# Patient Record
Sex: FEMALE | Race: WHITE | NOT HISPANIC OR LATINO | Employment: OTHER | URBAN - METROPOLITAN AREA
[De-identification: names, ages, dates, MRNs, and addresses within clinical notes are randomized per-mention and may not be internally consistent; named-entity substitution may affect disease eponyms.]

---

## 2023-05-24 ENCOUNTER — OFFICE VISIT (OUTPATIENT)
Dept: PRIMARY CARE | Facility: CLINIC | Age: 63
End: 2023-05-24
Payer: MEDICARE

## 2023-05-24 VITALS — DIASTOLIC BLOOD PRESSURE: 84 MMHG | HEART RATE: 72 BPM | OXYGEN SATURATION: 97 % | SYSTOLIC BLOOD PRESSURE: 125 MMHG

## 2023-05-24 DIAGNOSIS — Z23 NEED FOR PNEUMOCOCCAL VACCINE: ICD-10-CM

## 2023-05-24 DIAGNOSIS — Z12.31 BREAST CANCER SCREENING BY MAMMOGRAM: ICD-10-CM

## 2023-05-24 DIAGNOSIS — E78.5 DYSLIPIDEMIA: ICD-10-CM

## 2023-05-24 DIAGNOSIS — Z78.0 ASYMPTOMATIC POSTMENOPAUSAL ESTROGEN DEFICIENCY: ICD-10-CM

## 2023-05-24 DIAGNOSIS — R73.9 BORDERLINE HYPERGLYCEMIA: Primary | ICD-10-CM

## 2023-05-24 LAB
ALANINE AMINOTRANSFERASE (SGPT) (U/L) IN SER/PLAS: 15 U/L (ref 7–45)
ALBUMIN (G/DL) IN SER/PLAS: 4.6 G/DL (ref 3.4–5)
ALKALINE PHOSPHATASE (U/L) IN SER/PLAS: 105 U/L (ref 33–136)
ANION GAP IN SER/PLAS: 15 MMOL/L (ref 10–20)
ASPARTATE AMINOTRANSFERASE (SGOT) (U/L) IN SER/PLAS: 18 U/L (ref 9–39)
BASOPHILS (10*3/UL) IN BLOOD BY AUTOMATED COUNT: 0.04 X10E9/L (ref 0–0.1)
BASOPHILS/100 LEUKOCYTES IN BLOOD BY AUTOMATED COUNT: 0.8 % (ref 0–2)
BILIRUBIN TOTAL (MG/DL) IN SER/PLAS: 1.4 MG/DL (ref 0–1.2)
CALCIUM (MG/DL) IN SER/PLAS: 10.2 MG/DL (ref 8.6–10.6)
CARBON DIOXIDE, TOTAL (MMOL/L) IN SER/PLAS: 24 MMOL/L (ref 21–32)
CHLORIDE (MMOL/L) IN SER/PLAS: 104 MMOL/L (ref 98–107)
CHOLESTEROL (MG/DL) IN SER/PLAS: 255 MG/DL (ref 0–199)
CHOLESTEROL IN HDL (MG/DL) IN SER/PLAS: 81.4 MG/DL
CHOLESTEROL/HDL RATIO: 3.1
CREATININE (MG/DL) IN SER/PLAS: 0.8 MG/DL (ref 0.5–1.05)
EOSINOPHILS (10*3/UL) IN BLOOD BY AUTOMATED COUNT: 0.16 X10E9/L (ref 0–0.7)
EOSINOPHILS/100 LEUKOCYTES IN BLOOD BY AUTOMATED COUNT: 3.1 % (ref 0–6)
ERYTHROCYTE DISTRIBUTION WIDTH (RATIO) BY AUTOMATED COUNT: 12.4 % (ref 11.5–14.5)
ERYTHROCYTE MEAN CORPUSCULAR HEMOGLOBIN CONCENTRATION (G/DL) BY AUTOMATED: 32.6 G/DL (ref 32–36)
ERYTHROCYTE MEAN CORPUSCULAR VOLUME (FL) BY AUTOMATED COUNT: 99 FL (ref 80–100)
ERYTHROCYTES (10*6/UL) IN BLOOD BY AUTOMATED COUNT: 4.43 X10E12/L (ref 4–5.2)
ESTIMATED AVERAGE GLUCOSE FOR HBA1C: 100 MG/DL
GFR FEMALE: 82 ML/MIN/1.73M2
GLUCOSE (MG/DL) IN SER/PLAS: 94 MG/DL (ref 74–99)
HEMATOCRIT (%) IN BLOOD BY AUTOMATED COUNT: 43.9 % (ref 36–46)
HEMOGLOBIN (G/DL) IN BLOOD: 14.3 G/DL (ref 12–16)
HEMOGLOBIN A1C/HEMOGLOBIN TOTAL IN BLOOD: 5.1 %
IMMATURE GRANULOCYTES/100 LEUKOCYTES IN BLOOD BY AUTOMATED COUNT: 0.4 % (ref 0–0.9)
LDL: 148 MG/DL (ref 0–99)
LEUKOCYTES (10*3/UL) IN BLOOD BY AUTOMATED COUNT: 5.1 X10E9/L (ref 4.4–11.3)
LYMPHOCYTES (10*3/UL) IN BLOOD BY AUTOMATED COUNT: 1.58 X10E9/L (ref 1.2–4.8)
LYMPHOCYTES/100 LEUKOCYTES IN BLOOD BY AUTOMATED COUNT: 30.8 % (ref 13–44)
MONOCYTES (10*3/UL) IN BLOOD BY AUTOMATED COUNT: 0.38 X10E9/L (ref 0.1–1)
MONOCYTES/100 LEUKOCYTES IN BLOOD BY AUTOMATED COUNT: 7.4 % (ref 2–10)
NEUTROPHILS (10*3/UL) IN BLOOD BY AUTOMATED COUNT: 2.95 X10E9/L (ref 1.2–7.7)
NEUTROPHILS/100 LEUKOCYTES IN BLOOD BY AUTOMATED COUNT: 57.5 % (ref 40–80)
NRBC (PER 100 WBCS) BY AUTOMATED COUNT: 0 /100 WBC (ref 0–0)
PLATELETS (10*3/UL) IN BLOOD AUTOMATED COUNT: 278 X10E9/L (ref 150–450)
POTASSIUM (MMOL/L) IN SER/PLAS: 4.5 MMOL/L (ref 3.5–5.3)
PROTEIN TOTAL: 7.4 G/DL (ref 6.4–8.2)
SODIUM (MMOL/L) IN SER/PLAS: 138 MMOL/L (ref 136–145)
THYROTROPIN (MIU/L) IN SER/PLAS BY DETECTION LIMIT <= 0.05 MIU/L: 1.98 MIU/L (ref 0.44–3.98)
TRIGLYCERIDE (MG/DL) IN SER/PLAS: 130 MG/DL (ref 0–149)
UREA NITROGEN (MG/DL) IN SER/PLAS: 15 MG/DL (ref 6–23)
VLDL: 26 MG/DL (ref 0–40)

## 2023-05-24 PROCEDURE — 90677 PCV20 VACCINE IM: CPT | Performed by: INTERNAL MEDICINE

## 2023-05-24 PROCEDURE — 85025 COMPLETE CBC W/AUTO DIFF WBC: CPT

## 2023-05-24 PROCEDURE — G0009 ADMIN PNEUMOCOCCAL VACCINE: HCPCS | Performed by: INTERNAL MEDICINE

## 2023-05-24 PROCEDURE — G0439 PPPS, SUBSEQ VISIT: HCPCS | Performed by: INTERNAL MEDICINE

## 2023-05-24 PROCEDURE — 83036 HEMOGLOBIN GLYCOSYLATED A1C: CPT

## 2023-05-24 PROCEDURE — 1170F FXNL STATUS ASSESSED: CPT | Performed by: INTERNAL MEDICINE

## 2023-05-24 PROCEDURE — 84443 ASSAY THYROID STIM HORMONE: CPT

## 2023-05-24 PROCEDURE — 99214 OFFICE O/P EST MOD 30 MIN: CPT | Performed by: INTERNAL MEDICINE

## 2023-05-24 PROCEDURE — 80061 LIPID PANEL: CPT

## 2023-05-24 PROCEDURE — 80053 COMPREHEN METABOLIC PANEL: CPT

## 2023-05-24 NOTE — PATIENT INSTRUCTIONS
Mirna,     Call [phone] to schedule mammogram in August and bone density anytime.   Go one floor down to [address] for lab work today and I'll mail copies and call with results!   You're getting Prevnar 20, the newest best once in your lifetime then done forever pneumonia shot.     CL

## 2023-06-01 ASSESSMENT — ENCOUNTER SYMPTOMS
OCCASIONAL FEELINGS OF UNSTEADINESS: 0
DEPRESSION: 0
LOSS OF SENSATION IN FEET: 0

## 2023-06-01 ASSESSMENT — PATIENT HEALTH QUESTIONNAIRE - PHQ9
SUM OF ALL RESPONSES TO PHQ9 QUESTIONS 1 AND 2: 0
1. LITTLE INTEREST OR PLEASURE IN DOING THINGS: NOT AT ALL
2. FEELING DOWN, DEPRESSED OR HOPELESS: NOT AT ALL

## 2023-06-01 ASSESSMENT — ACTIVITIES OF DAILY LIVING (ADL)
TAKING_MEDICATION: INDEPENDENT
DOING_HOUSEWORK: INDEPENDENT
GROCERY_SHOPPING: INDEPENDENT
BATHING: INDEPENDENT
MANAGING_FINANCES: INDEPENDENT
DRESSING: INDEPENDENT

## 2023-06-01 NOTE — PROGRESS NOTES
HPI  63 year old patient with no chronic medical history presenting for CPE  Back pain resolved since then  She has no health issues that she wants to discuss about and feel really well.  Review of systems is negative for chest pain, shortness of breath, syncope, dizziness or any other symptoms.   Last set of labs in June 2022 were normal. CBC: Normal. CMP: Normal. Hemoglobin A1c normal, TSH normal. Recent mammogram negative for malignancy 8/2022  Medications:  None  Physical Exam:  Vital signs: /86, HR 82, RR 16, saturating well on room air  General: NAD  Cardiac: RRR S1/S2 normal  Lungs: CTAB  GI: NT/ND  Extremities: No edema  Neuro: No gross deficits  Psych: Alert and oriented x 3  Assessment and Plan:  63 year old patient with no chronic medical history presenting for CPE  -Will repeat labs this year  -Due for mammogram on 8/2023  -Dexa scan due this year  -Pneumonia vaccine this visit  Elevated BP:  She had elevated BP during her visit today but does not have a history of this. We have asked her to check ambulatory blood pressures and report back to us.  -Her ASCVD score will be determined by her repeat blood pressures. If her blood pressures continue to remain elevated, we would recommend starting blood pressure medication such as amlodipine and lipid lowering medication statin.   Health Maintenance:  Pap 2019 with GYN  Tdap 2022  Dexa scan - Due   Bauxite in 2019--due in 10 years  Mammogram - due in 8/2023  Pneumonia vaccine - today   See alternate note

## 2023-06-14 ENCOUNTER — OFFICE VISIT (OUTPATIENT)
Dept: PRIMARY CARE | Facility: CLINIC | Age: 63
End: 2023-06-14
Payer: MEDICARE

## 2023-06-14 DIAGNOSIS — H66.90 ACUTE OTITIS MEDIA, UNSPECIFIED OTITIS MEDIA TYPE: Primary | ICD-10-CM

## 2023-06-14 PROCEDURE — 99214 OFFICE O/P EST MOD 30 MIN: CPT | Performed by: INTERNAL MEDICINE

## 2023-06-14 RX ORDER — AMOXICILLIN AND CLAVULANATE POTASSIUM 875; 125 MG/1; MG/1
875 TABLET, FILM COATED ORAL 2 TIMES DAILY
Qty: 20 TABLET | Refills: 0 | Status: SHIPPED | OUTPATIENT
Start: 2023-06-14 | End: 2023-06-21 | Stop reason: ALTCHOICE

## 2023-06-14 NOTE — PROGRESS NOTES
Mirna Lutz is a 62 yo F presenting for sick visit.   CPE 5.24.23.     *Cough x1 week with L ear fullness   -congested nose   -COVID test at home was negative   -no sputum         1. DLD - without dx of HTN ASCVD 4.8%   -no statin   -may need in the future     2. Elevated BP without dx of HTN   -normotensive today     3.   #HM   -screen labs 5.23   -dexa and mammo pending   -cscope 1.19   -shingrix x2   -tdap 2022         Gen alert well appearin ghoarse   HEENT +very erythematous TM on the left   Pulm CTAB       A/P   Acute OM   -Augmentin x 10 days   -if no sig improvement by next week call me

## 2023-06-21 ENCOUNTER — OFFICE VISIT (OUTPATIENT)
Dept: PRIMARY CARE | Facility: CLINIC | Age: 63
End: 2023-06-21
Payer: MEDICARE

## 2023-06-21 DIAGNOSIS — H66.90 ACUTE OTITIS MEDIA, UNSPECIFIED OTITIS MEDIA TYPE: Primary | ICD-10-CM

## 2023-06-21 PROCEDURE — 99213 OFFICE O/P EST LOW 20 MIN: CPT | Performed by: INTERNAL MEDICINE

## 2023-06-21 RX ORDER — AMOXICILLIN AND CLAVULANATE POTASSIUM 875; 125 MG/1; MG/1
875 TABLET, FILM COATED ORAL 2 TIMES DAILY
Qty: 20 TABLET | Refills: 0 | Status: SHIPPED | OUTPATIENT
Start: 2023-06-21 | End: 2023-07-01

## 2023-06-21 NOTE — PATIENT INSTRUCTIONS
Carmel -   Continue the Augmentin for a total of 2 weeks - I'm sending in extra in case we need it.   Call the referral line and ask to schedule ENT. Let them know I said it was ok to schedule with either otology or general and I'm trying to escalate this referral!     CL

## 2023-06-21 NOTE — PROGRESS NOTES
Mirna Lutz presents in 1 week FU to OM visit 6.14.23     At that time 1 wk URI sxs with prominant erythema of L TM.   -Augmentin x 7 days   -some improvement but persistent L ear pain, fullness with some radiation into inferior neck   -no difficulty swallowing, some pain with swallowing   [ ]continue Augmentin x 2 week total course   [ ]ENT referral        Chronic PL:  1. DLD - without dx of HTN ASCVD 4.8%   -no statin   -may need in the future      2. Elevated BP without dx of HTN   -normotensive today      3.   #HM   -screen labs 5.23   -dexa and mammo pending   -cscope 1.19   -shingrix x2   -tdap 2022            Gen alert well appearin ghoarse   HEENT +very erythematous TM on the left with no sig change since last week no LAD  Pulm CTAB        A/P   Acute OM unresolving x 7 days abx   -continue augmentin   -ENT referral

## 2023-10-05 ENCOUNTER — TELEPHONE (OUTPATIENT)
Dept: OBSTETRICS AND GYNECOLOGY | Facility: CLINIC | Age: 63
End: 2023-10-05
Payer: MEDICARE

## 2023-10-25 ENCOUNTER — ANCILLARY PROCEDURE (OUTPATIENT)
Dept: RADIOLOGY | Facility: CLINIC | Age: 63
End: 2023-10-25
Payer: MEDICARE

## 2023-10-25 DIAGNOSIS — Z12.31 BREAST CANCER SCREENING BY MAMMOGRAM: ICD-10-CM

## 2023-10-25 PROCEDURE — 77067 SCR MAMMO BI INCL CAD: CPT | Mod: 50

## 2023-10-25 PROCEDURE — 77067 SCR MAMMO BI INCL CAD: CPT | Mod: BILATERAL PROCEDURE | Performed by: RADIOLOGY

## 2023-10-25 PROCEDURE — 77063 BREAST TOMOSYNTHESIS BI: CPT | Mod: BILATERAL PROCEDURE | Performed by: RADIOLOGY

## 2023-11-06 ENCOUNTER — APPOINTMENT (OUTPATIENT)
Dept: OBSTETRICS AND GYNECOLOGY | Facility: CLINIC | Age: 63
End: 2023-11-06

## 2023-11-28 ENCOUNTER — OFFICE VISIT (OUTPATIENT)
Dept: PRIMARY CARE | Facility: CLINIC | Age: 63
End: 2023-11-28
Payer: MEDICARE

## 2023-11-28 DIAGNOSIS — J06.9 UPPER RESPIRATORY TRACT INFECTION, UNSPECIFIED TYPE: Primary | ICD-10-CM

## 2023-11-28 PROCEDURE — 99213 OFFICE O/P EST LOW 20 MIN: CPT | Performed by: INTERNAL MEDICINE

## 2023-11-28 PROCEDURE — 1126F AMNT PAIN NOTED NONE PRSNT: CPT | Performed by: INTERNAL MEDICINE

## 2023-11-28 RX ORDER — BUDESONIDE AND FORMOTEROL FUMARATE DIHYDRATE 160; 4.5 UG/1; UG/1
2 AEROSOL RESPIRATORY (INHALATION)
Qty: 1 EACH | Refills: 11 | Status: SHIPPED | OUTPATIENT
Start: 2023-11-28 | End: 2023-12-22

## 2023-11-28 NOTE — PROGRESS NOTES
Mirna Lutz is a 64 yo F presenting for acute visit now x 9 days of URI with prominent dry cough; was exposed to RSV by a granddaughter in interval; did a home COVID test which was negative.   Had URI last in 6.23 s/p Augmentin x 2 wks total course.       CPE/MCR 5.2024.         Mirna Lutz presents in 1 week FU to OM visit 6.14.23      At that time 1 wk URI sxs with prominant erythema of L TM.   -Augmentin x 7 days   -some improvement but persistent L ear pain, fullness with some radiation into inferior neck   -no difficulty swallowing, some pain with swallowing   [ ]continue Augmentin x 2 week total course   [ ]ENT referral         Chronic PL:  1. DLD - without dx of HTN ASCVD 4.8%   -no statin   -may need in the future      2. Elevated BP without dx of HTN   -normotensive today        #HM   -screen labs 5.23   -dexa and mammo pending   -cscope 1.19   -shingrix x2   -tdap 2022               ROS   Gen neg fever neg chills pos mild malaise   Pulm pos cough neg wheeze neg dyspnea       O   Gen well appearing   HENT mmm pharynx clear   CV rrr nl s1/2   Pulm CTAB no w/r/r       A/P   Mirna presents with 64 yo F presenting with 9 days of URI illness in the setting of RSV exposure. Suspected RSV.   We do not have RSV swabs but she really would prefer confirmation. Advised Urgent Care.   Start a LABA-ICS inhaler for post-viral cough.   Consider abx if fails to resolve within total 2 weeks

## 2023-11-28 NOTE — PATIENT INSTRUCTIONS
Mirna,     If not better by next Monday call and we will start an antibiotic.     Do take the inhaler as prescribed daily until cough is fully gone - the inhaler is a months worth. If costly at [facility], call me.     CL

## 2023-12-01 ENCOUNTER — TELEPHONE (OUTPATIENT)
Dept: PRIMARY CARE | Facility: CLINIC | Age: 63
End: 2023-12-01
Payer: MEDICARE

## 2023-12-01 DIAGNOSIS — J06.9 UPPER RESPIRATORY TRACT INFECTION, UNSPECIFIED TYPE: Primary | ICD-10-CM

## 2023-12-01 RX ORDER — METHYLPREDNISOLONE 4 MG/1
TABLET ORAL
Qty: 21 TABLET | Refills: 0 | Status: SHIPPED | OUTPATIENT
Start: 2023-12-01 | End: 2023-12-08

## 2023-12-21 DIAGNOSIS — J06.9 UPPER RESPIRATORY TRACT INFECTION, UNSPECIFIED TYPE: ICD-10-CM

## 2023-12-22 RX ORDER — BUDESONIDE AND FORMOTEROL FUMARATE DIHYDRATE 160; 4.5 UG/1; UG/1
AEROSOL RESPIRATORY (INHALATION)
Qty: 30.6 EACH | Refills: 4 | Status: SHIPPED | OUTPATIENT
Start: 2023-12-22

## 2024-04-29 ENCOUNTER — TELEPHONE (OUTPATIENT)
Dept: PRIMARY CARE | Facility: CLINIC | Age: 64
End: 2024-04-29
Payer: MEDICARE

## 2024-04-30 DIAGNOSIS — E55.9 VITAMIN D DEFICIENCY: ICD-10-CM

## 2024-04-30 DIAGNOSIS — Z00.00 ROUTINE GENERAL MEDICAL EXAMINATION AT A HEALTH CARE FACILITY: Primary | ICD-10-CM

## 2024-04-30 DIAGNOSIS — R73.9 BORDERLINE HYPERGLYCEMIA: ICD-10-CM

## 2024-04-30 DIAGNOSIS — E78.5 DYSLIPIDEMIA: ICD-10-CM

## 2024-05-03 ENCOUNTER — LAB (OUTPATIENT)
Dept: LAB | Facility: LAB | Age: 64
End: 2024-05-03
Payer: MEDICARE

## 2024-05-03 DIAGNOSIS — R73.9 BORDERLINE HYPERGLYCEMIA: ICD-10-CM

## 2024-05-03 DIAGNOSIS — E78.5 DYSLIPIDEMIA: ICD-10-CM

## 2024-05-03 DIAGNOSIS — Z00.00 ROUTINE GENERAL MEDICAL EXAMINATION AT A HEALTH CARE FACILITY: ICD-10-CM

## 2024-05-03 DIAGNOSIS — E55.9 VITAMIN D DEFICIENCY: ICD-10-CM

## 2024-05-03 LAB
25(OH)D3 SERPL-MCNC: 37 NG/ML (ref 30–100)
ALBUMIN SERPL BCP-MCNC: 4.3 G/DL (ref 3.4–5)
ALP SERPL-CCNC: 93 U/L (ref 33–136)
ALT SERPL W P-5'-P-CCNC: 13 U/L (ref 7–45)
ANION GAP SERPL CALC-SCNC: 14 MMOL/L (ref 10–20)
AST SERPL W P-5'-P-CCNC: 16 U/L (ref 9–39)
BASOPHILS # BLD AUTO: 0.05 X10*3/UL (ref 0–0.1)
BASOPHILS NFR BLD AUTO: 1 %
BILIRUB SERPL-MCNC: 1.1 MG/DL (ref 0–1.2)
BUN SERPL-MCNC: 15 MG/DL (ref 6–23)
CALCIUM SERPL-MCNC: 9.8 MG/DL (ref 8.6–10.6)
CHLORIDE SERPL-SCNC: 103 MMOL/L (ref 98–107)
CHOLEST SERPL-MCNC: 244 MG/DL (ref 0–199)
CHOLESTEROL/HDL RATIO: 3.4
CO2 SERPL-SCNC: 26 MMOL/L (ref 21–32)
CREAT SERPL-MCNC: 0.72 MG/DL (ref 0.5–1.05)
EGFRCR SERPLBLD CKD-EPI 2021: >90 ML/MIN/1.73M*2
EOSINOPHIL # BLD AUTO: 0.12 X10*3/UL (ref 0–0.7)
EOSINOPHIL NFR BLD AUTO: 2.3 %
ERYTHROCYTE [DISTWIDTH] IN BLOOD BY AUTOMATED COUNT: 12.5 % (ref 11.5–14.5)
EST. AVERAGE GLUCOSE BLD GHB EST-MCNC: 91 MG/DL
GLUCOSE SERPL-MCNC: 97 MG/DL (ref 74–99)
HBA1C MFR BLD: 4.8 %
HCT VFR BLD AUTO: 41.7 % (ref 36–46)
HDLC SERPL-MCNC: 70.9 MG/DL
HGB BLD-MCNC: 13.7 G/DL (ref 12–16)
IMM GRANULOCYTES # BLD AUTO: 0.02 X10*3/UL (ref 0–0.7)
IMM GRANULOCYTES NFR BLD AUTO: 0.4 % (ref 0–0.9)
LDLC SERPL CALC-MCNC: 154 MG/DL
LYMPHOCYTES # BLD AUTO: 1.57 X10*3/UL (ref 1.2–4.8)
LYMPHOCYTES NFR BLD AUTO: 30.4 %
MCH RBC QN AUTO: 32.1 PG (ref 26–34)
MCHC RBC AUTO-ENTMCNC: 32.9 G/DL (ref 32–36)
MCV RBC AUTO: 98 FL (ref 80–100)
MONOCYTES # BLD AUTO: 0.39 X10*3/UL (ref 0.1–1)
MONOCYTES NFR BLD AUTO: 7.5 %
NEUTROPHILS # BLD AUTO: 3.02 X10*3/UL (ref 1.2–7.7)
NEUTROPHILS NFR BLD AUTO: 58.4 %
NON HDL CHOLESTEROL: 173 MG/DL (ref 0–149)
NRBC BLD-RTO: 0 /100 WBCS (ref 0–0)
PLATELET # BLD AUTO: 256 X10*3/UL (ref 150–450)
POTASSIUM SERPL-SCNC: 4.3 MMOL/L (ref 3.5–5.3)
PROT SERPL-MCNC: 6.9 G/DL (ref 6.4–8.2)
RBC # BLD AUTO: 4.27 X10*6/UL (ref 4–5.2)
SODIUM SERPL-SCNC: 139 MMOL/L (ref 136–145)
TRIGL SERPL-MCNC: 96 MG/DL (ref 0–149)
TSH SERPL-ACNC: 2.52 MIU/L (ref 0.44–3.98)
VLDL: 19 MG/DL (ref 0–40)
WBC # BLD AUTO: 5.2 X10*3/UL (ref 4.4–11.3)

## 2024-05-03 PROCEDURE — 80053 COMPREHEN METABOLIC PANEL: CPT

## 2024-05-03 PROCEDURE — 84443 ASSAY THYROID STIM HORMONE: CPT

## 2024-05-03 PROCEDURE — 83036 HEMOGLOBIN GLYCOSYLATED A1C: CPT

## 2024-05-03 PROCEDURE — 80061 LIPID PANEL: CPT

## 2024-05-03 PROCEDURE — 85025 COMPLETE CBC W/AUTO DIFF WBC: CPT

## 2024-05-03 PROCEDURE — 36415 COLL VENOUS BLD VENIPUNCTURE: CPT

## 2024-05-03 PROCEDURE — 82306 VITAMIN D 25 HYDROXY: CPT

## 2024-05-17 ENCOUNTER — OFFICE VISIT (OUTPATIENT)
Dept: PRIMARY CARE | Facility: CLINIC | Age: 64
End: 2024-05-17
Payer: MEDICARE

## 2024-05-17 VITALS
BODY MASS INDEX: 23.57 KG/M2 | HEART RATE: 76 BPM | SYSTOLIC BLOOD PRESSURE: 110 MMHG | OXYGEN SATURATION: 97 % | DIASTOLIC BLOOD PRESSURE: 80 MMHG | WEIGHT: 155 LBS

## 2024-05-17 DIAGNOSIS — E78.5 DYSLIPIDEMIA: Primary | ICD-10-CM

## 2024-05-17 DIAGNOSIS — Z12.31 BREAST CANCER SCREENING BY MAMMOGRAM: ICD-10-CM

## 2024-05-17 DIAGNOSIS — Z78.0 ASYMPTOMATIC POSTMENOPAUSAL STATE: ICD-10-CM

## 2024-05-17 PROCEDURE — G0439 PPPS, SUBSEQ VISIT: HCPCS | Performed by: INTERNAL MEDICINE

## 2024-05-17 PROCEDURE — 1170F FXNL STATUS ASSESSED: CPT | Performed by: INTERNAL MEDICINE

## 2024-05-17 RX ORDER — ROSUVASTATIN CALCIUM 5 MG/1
5 TABLET, COATED ORAL DAILY
Qty: 100 TABLET | Refills: 3 | Status: SHIPPED | OUTPATIENT
Start: 2024-05-17 | End: 2025-06-21

## 2024-05-17 ASSESSMENT — ACTIVITIES OF DAILY LIVING (ADL)
MANAGING_FINANCES: INDEPENDENT
GROCERY_SHOPPING: INDEPENDENT
BATHING: INDEPENDENT
TAKING_MEDICATION: INDEPENDENT
DRESSING: INDEPENDENT
DOING_HOUSEWORK: INDEPENDENT

## 2024-05-17 ASSESSMENT — PATIENT HEALTH QUESTIONNAIRE - PHQ9
SUM OF ALL RESPONSES TO PHQ9 QUESTIONS 1 AND 2: 0
2. FEELING DOWN, DEPRESSED OR HOPELESS: NOT AT ALL
1. LITTLE INTEREST OR PLEASURE IN DOING THINGS: NOT AT ALL

## 2024-05-17 ASSESSMENT — ENCOUNTER SYMPTOMS
DEPRESSION: 0
LOSS OF SENSATION IN FEET: 0
OCCASIONAL FEELINGS OF UNSTEADINESS: 0

## 2024-05-17 NOTE — PATIENT INSTRUCTIONS
Mirna,     Start rosuvastatin 5 mg daily - anytime of the day, with or without food, OK if you miss it occasionally. I am ordering repeat labs for BEFORE your next visit with Dr. Darling - so you can do them, fasting from midnight the night before. He will review these.   Call [phone] to schedule bone density anytime and mammogram 10- or later.     CL

## 2024-05-17 NOTE — PROGRESS NOTES
Mirna Lutz is a 63 yo F presenting for CPE/MCR.     DLD, ASCVD 4.8 last year - discussed at length. New start rosuva 5. Repeat labs 6 mo.     Elevated BP without dx of HTN     Complicated URI/OM last fall 2023       #HM   -screen labs 5.2024   -mammo 10.25.2023   -dexa due & ordered   -cscope 1.2019 - 10 yrs       Gen Aox3 NAD well appearing   HEENT mmm pharynx clear no cervical LAD   Eyes sclerae clear   CV rrr nl s1/2 no m/r/g  Pulm CTAB no adventitious sounds   Ext warm dry no edema 2+ DP pulse

## 2024-09-05 ENCOUNTER — OFFICE VISIT (OUTPATIENT)
Dept: PRIMARY CARE | Facility: CLINIC | Age: 64
End: 2024-09-05
Payer: MEDICARE

## 2024-09-05 VITALS — DIASTOLIC BLOOD PRESSURE: 74 MMHG | SYSTOLIC BLOOD PRESSURE: 123 MMHG

## 2024-09-05 DIAGNOSIS — E78.5 DYSLIPIDEMIA: Primary | ICD-10-CM

## 2024-09-05 DIAGNOSIS — M76.61 ACHILLES TENDINITIS OF RIGHT LOWER EXTREMITY: ICD-10-CM

## 2024-09-05 PROCEDURE — 99213 OFFICE O/P EST LOW 20 MIN: CPT | Performed by: INTERNAL MEDICINE

## 2024-09-05 NOTE — PROGRESS NOTES
Subjective   Patient ID: Mirna Lutz is a 64 y.o. female who presents for No chief complaint on file..    HPI Sick visit was in [place] for a wedding was getting a rash is slipped and pulls her left leg bruising occurred later she usually walks or does yoga for exercise and found that she could not do that no chest pain no shortness of breath no fever does not take any blood thinners has a longstanding dermatitis on her legs had been on an airplane to come home    Review of Systems    Objective   There were no vitals taken for this visit.    Physical Exam  Vital signs noted alert and oriented x 3 NCAT no JVD chest clear to auscultation CV regular rate and rhythm S1-S2 without murmur gallop or rub extremities no clubbing cyanosis or edema on the left on the right there is a large portion of the tibial area anteriorly with ecchymoses running down into the medial malleolus good range of motion of the ankle some tightness over the Achilles and the posterior gastroc  Assessment/Plan    impression gastroc strain Achilles strain tibialis tendinitis other diagnoses  Plan PT now that is been more than 1 week since original injury gentle range of motion physical therapy if no better good shoe wear Tylenol if needed discussed with her recent blood work and cholesterol and cholesterol medicine she is not taking discussed with risk-benefit side effects and may consider to do so and then follow-up good hydration and recheck

## 2024-10-31 ENCOUNTER — LAB (OUTPATIENT)
Dept: LAB | Facility: LAB | Age: 64
End: 2024-10-31
Payer: MEDICARE

## 2024-10-31 DIAGNOSIS — E78.5 DYSLIPIDEMIA: ICD-10-CM

## 2024-10-31 LAB
ALBUMIN SERPL BCP-MCNC: 4.6 G/DL (ref 3.4–5)
ALP SERPL-CCNC: 85 U/L (ref 33–136)
ALT SERPL W P-5'-P-CCNC: 15 U/L (ref 7–45)
ANION GAP SERPL CALC-SCNC: 14 MMOL/L (ref 10–20)
AST SERPL W P-5'-P-CCNC: 16 U/L (ref 9–39)
BILIRUB SERPL-MCNC: 1.2 MG/DL (ref 0–1.2)
BUN SERPL-MCNC: 18 MG/DL (ref 6–23)
CALCIUM SERPL-MCNC: 9.5 MG/DL (ref 8.6–10.6)
CHLORIDE SERPL-SCNC: 104 MMOL/L (ref 98–107)
CHOLEST SERPL-MCNC: 258 MG/DL (ref 0–199)
CHOLESTEROL/HDL RATIO: 3.5
CO2 SERPL-SCNC: 25 MMOL/L (ref 21–32)
CREAT SERPL-MCNC: 0.76 MG/DL (ref 0.5–1.05)
EGFRCR SERPLBLD CKD-EPI 2021: 87 ML/MIN/1.73M*2
GLUCOSE SERPL-MCNC: 96 MG/DL (ref 74–99)
HDLC SERPL-MCNC: 73.4 MG/DL
LDLC SERPL CALC-MCNC: 165 MG/DL
NON HDL CHOLESTEROL: 185 MG/DL (ref 0–149)
POTASSIUM SERPL-SCNC: 4.3 MMOL/L (ref 3.5–5.3)
PROT SERPL-MCNC: 7 G/DL (ref 6.4–8.2)
SODIUM SERPL-SCNC: 139 MMOL/L (ref 136–145)
TRIGL SERPL-MCNC: 98 MG/DL (ref 0–149)
VLDL: 20 MG/DL (ref 0–40)

## 2024-10-31 PROCEDURE — 80053 COMPREHEN METABOLIC PANEL: CPT

## 2024-10-31 PROCEDURE — 80061 LIPID PANEL: CPT

## 2024-10-31 PROCEDURE — 36415 COLL VENOUS BLD VENIPUNCTURE: CPT

## 2024-11-06 ENCOUNTER — APPOINTMENT (OUTPATIENT)
Dept: PRIMARY CARE | Facility: CLINIC | Age: 64
End: 2024-11-06
Payer: MEDICARE

## 2024-11-06 VITALS
HEART RATE: 72 BPM | HEIGHT: 68 IN | DIASTOLIC BLOOD PRESSURE: 85 MMHG | BODY MASS INDEX: 23.76 KG/M2 | SYSTOLIC BLOOD PRESSURE: 128 MMHG | OXYGEN SATURATION: 96 % | WEIGHT: 156.8 LBS

## 2024-11-06 DIAGNOSIS — E78.5 DYSLIPIDEMIA: Primary | ICD-10-CM

## 2024-11-06 DIAGNOSIS — R73.9 BORDERLINE HYPERGLYCEMIA: ICD-10-CM

## 2024-11-06 DIAGNOSIS — Z11.59 ENCOUNTER FOR HEPATITIS C SCREENING TEST FOR LOW RISK PATIENT: ICD-10-CM

## 2024-11-06 DIAGNOSIS — Z78.0 ASYMPTOMATIC POSTMENOPAUSAL STATE: ICD-10-CM

## 2024-11-06 DIAGNOSIS — E55.9 MILD VITAMIN D DEFICIENCY: ICD-10-CM

## 2024-11-06 DIAGNOSIS — R68.89 OTHER GENERAL SYMPTOMS AND SIGNS: ICD-10-CM

## 2024-11-06 PROCEDURE — 3008F BODY MASS INDEX DOCD: CPT | Performed by: STUDENT IN AN ORGANIZED HEALTH CARE EDUCATION/TRAINING PROGRAM

## 2024-11-06 PROCEDURE — 1126F AMNT PAIN NOTED NONE PRSNT: CPT | Performed by: STUDENT IN AN ORGANIZED HEALTH CARE EDUCATION/TRAINING PROGRAM

## 2024-11-06 PROCEDURE — 1159F MED LIST DOCD IN RCRD: CPT | Performed by: STUDENT IN AN ORGANIZED HEALTH CARE EDUCATION/TRAINING PROGRAM

## 2024-11-06 PROCEDURE — 1036F TOBACCO NON-USER: CPT | Performed by: STUDENT IN AN ORGANIZED HEALTH CARE EDUCATION/TRAINING PROGRAM

## 2024-11-06 PROCEDURE — 99214 OFFICE O/P EST MOD 30 MIN: CPT | Performed by: STUDENT IN AN ORGANIZED HEALTH CARE EDUCATION/TRAINING PROGRAM

## 2024-11-06 RX ORDER — ROSUVASTATIN CALCIUM 5 MG/1
5 TABLET, COATED ORAL
Qty: 36 TABLET | Refills: 3 | Status: SHIPPED | OUTPATIENT
Start: 2024-11-06 | End: 2025-11-01

## 2024-11-06 ASSESSMENT — PATIENT HEALTH QUESTIONNAIRE - PHQ9
2. FEELING DOWN, DEPRESSED OR HOPELESS: NOT AT ALL
SUM OF ALL RESPONSES TO PHQ9 QUESTIONS 1 AND 2: 0
1. LITTLE INTEREST OR PLEASURE IN DOING THINGS: NOT AT ALL

## 2024-11-06 ASSESSMENT — PAIN SCALES - GENERAL: PAINLEVEL_OUTOF10: 0-NO PAIN

## 2024-11-06 NOTE — PATIENT INSTRUCTIONS
Thank you for coming in today!    Call to schedule CT cardiac scoring (calcium score)  [phone]    Mammogram and DEXA (Bone Density Scan) in December as scheduled    Follow up with pharmacy for Shingles shot and updated COVID booster     Start Rosuvastatin 5mg M/W/F at the new year    Repeat labs about 2 months later  FASTING  Can be done at any  lab, no appointment needed  Plan around early March    Medicare Wellness Visit in May  Reach back out with any questions or concerns!    Dr. BETTENCOURT

## 2024-11-06 NOTE — PROGRESS NOTES
Mirna Lutz is a 64 y.o. female seen in Clinic at [facility] [place] by Dr. Jose Ramon Darling on 11/06/24 for routine care, as well as for management of the following chronic medical conditions: DLD (not currently on statin), borderline elevated BP, family history of breast cancer and cardiac disease. Patient presents today to establish care and transfer from prior primary care provider, Dr. Chavez     #DLD  #Family History of Cardiac Disease (sister/brother/father)   - increased from prior  - not taking Rosuva   - start 5mg every other day or M/W/F  [  ] repeat lipids and labs after 2 months on medication   [  ] CAC scoring to further risk stratify given family history      #Elevated BP without dx of HTN      #Family History of Breast Cancer  - mother  - live to be 93   [  ] annual mammogram, pending 12/2024     Past Medical History:   Past Medical History:   Diagnosis Date    Other specified health status 03/04/2015    No known problems     Subspecialty Medical Care:     Past Surgical History:   Past Surgical History:   Procedure Laterality Date    KNEE SURGERY  03/04/2015    Knee Surgery     Medications:  Current Outpatient Medications:     rosuvastatin (Crestor) 5 mg tablet, Take 1 tablet (5 mg) by mouth once daily. (Patient not taking: Reported on 11/6/2024), Disp: 100 tablet, Rfl: 3  Pharmacy: [facility] ([place])     Allergies: NKDA   No Known Allergies    Immunizations:   - Shingrix advised  - Updated COVID booster advised     Family History:   - CAD in father, brother, sister   Family History   Problem Relation Name Age of Onset    Breast cancer Mother       Social History:   Home/Living Situation/Falls/Safety Assessment: lives at home alone (); kids are local, watches grandchildren   Education/Employment/Work/Vocational: retired mostly,  (special education); worked in pre-K at    Activities: regular physical activity   Drug Use: remote smoker, social alcohol use   Diet: healthy dietary  "habits   Depression/Anxiety: no mental health concerns   Sexuality/Contraception/Menstrual History:    Sleep: none     Patient Information:  Health Insurance: Medicare   Transportation: drives   Healthcare POA/Guardian: Child   Contact Information: correct in EMR     Visit Vitals  /85 (BP Location: Left arm, Patient Position: Sitting, BP Cuff Size: Adult)   Pulse 72   Ht 1.727 m (5' 8\")   Wt 71.1 kg (156 lb 12.8 oz)   SpO2 96%   BMI 23.84 kg/m²   Smoking Status Never   BSA 1.85 m²      PHYSICAL EXAM:   General: well appearing  female, NAD, pleasant and engaged in encounter    HEENT: NCAT, MMM  CV: RRR, no m/r/g  PULM: CTAB, non-labored respirations   ABD: soft, NT, ND, + bowel sounds   : no suprapubic or CVA tenderness   EXT: WWP, no significant edema   SKIN: no rashes noted   NEURO: A&Ox4, symmetric facies, no gross motor or sensory deficits, normal gait  PSYCH: pleasant mood, appropriate affect     Assessment/Plan    Mirna Lutz is a 64 y.o. female seen in Clinic at [facility] [place] [facility] by Dr. Jose Ramon Darling on 11/06/24 for routine care, as well as for management of the following chronic medical conditions: DLD (not currently on statin), borderline elevated BP, family history of breast cancer and cardiac disease. Patient presents today to establish care and transfer from prior primary care provider, Dr. Chavez     #DLD  #Family History of Cardiac Disease (sister/brother/father)   - increased from prior  - not taking Rosuva   - start 5mg every other day or M/W/F  [  ] repeat lipids and labs after 2 months on medication   [  ] CAC scoring to further risk stratify given family history      #Elevated BP without dx of HTN      #Family History of Breast Cancer  - mother  - live to be 93   [  ] annual mammogram, pending 12/2024     #Health Maintenance    Cancer Screening  - Cervical Cancer Screening: last pap smear/HPV testing: last 2019, negative HPV co-testing (follows with GYN)   - Mammography: " pending 12/2024  - Colorectal Cancer Screening: last 01/2019, due 2029   - Lung Cancer Screening: NA     Laboratory Screening  - Lipid Screen: DLD as above   - ASCVD Score: ~6.8%, pending CAC scoring   - A1C, glucose screen: prior negative   - STI, HIV, Hep B screen: defer  - Hep C screen: with next labs     Imaging Screening  - Osteoporosis/DEXA screening: pending 12/2024     Immunizations:   - Influenza: annual UTD 2024  - COVID: updated booster advised  - Tdap: last 2022  - Prevnar, Pneumovax: completed   - Shingrix: advised completing series    Other Screening  - Health Literacy Assessment: excellent   - Depression screen: negative   - Home safety/partner violence screen: negative   - Hearing/Vision screens:   - Alcohol/tobacco/drug use screen: remote smoker, social alcohol   - Healthcare POA/Advanced Directives: children     Referrals: labs, CAC scoring, new start statin   Return to clinic in May 2025 for follow-up and CPE/MCW visit.     Patient Discussion:    Please call back the office with any questions at [phone]. In the case of an emergency, please call 911 or go to the nearest Emergency Department.      Jose Ramon Darling MD  Internal Medicine-Pediatrics  [facility] [address], [address]  P: [phone], F: [phone]

## 2024-11-11 ENCOUNTER — TELEPHONE (OUTPATIENT)
Dept: PRIMARY CARE | Facility: CLINIC | Age: 64
End: 2024-11-11

## 2024-12-06 ENCOUNTER — APPOINTMENT (OUTPATIENT)
Dept: RADIOLOGY | Facility: CLINIC | Age: 64
End: 2024-12-06
Payer: MEDICARE

## 2024-12-12 ENCOUNTER — APPOINTMENT (OUTPATIENT)
Dept: RADIOLOGY | Facility: CLINIC | Age: 64
End: 2024-12-12
Payer: MEDICARE

## 2025-01-06 ENCOUNTER — HOSPITAL ENCOUNTER (OUTPATIENT)
Dept: RADIOLOGY | Facility: CLINIC | Age: 65
Discharge: HOME | End: 2025-01-06
Payer: MEDICARE

## 2025-01-06 VITALS — BODY MASS INDEX: 23.76 KG/M2 | WEIGHT: 156.75 LBS | HEIGHT: 68 IN

## 2025-01-06 DIAGNOSIS — Z12.31 BREAST CANCER SCREENING BY MAMMOGRAM: ICD-10-CM

## 2025-01-06 PROCEDURE — 77063 BREAST TOMOSYNTHESIS BI: CPT | Performed by: RADIOLOGY

## 2025-01-06 PROCEDURE — 77067 SCR MAMMO BI INCL CAD: CPT

## 2025-01-06 PROCEDURE — 77067 SCR MAMMO BI INCL CAD: CPT | Performed by: RADIOLOGY

## 2025-01-16 ENCOUNTER — APPOINTMENT (OUTPATIENT)
Dept: RADIOLOGY | Facility: CLINIC | Age: 65
End: 2025-01-16
Payer: MEDICARE

## 2025-03-06 ENCOUNTER — APPOINTMENT (OUTPATIENT)
Dept: RADIOLOGY | Facility: CLINIC | Age: 65
End: 2025-03-06
Payer: MEDICARE

## 2025-04-17 ENCOUNTER — APPOINTMENT (OUTPATIENT)
Dept: RADIOLOGY | Facility: CLINIC | Age: 65
End: 2025-04-17
Payer: MEDICARE

## 2025-04-23 ENCOUNTER — APPOINTMENT (OUTPATIENT)
Dept: RADIOLOGY | Facility: CLINIC | Age: 65
End: 2025-04-23
Payer: MEDICARE

## 2025-04-25 ENCOUNTER — APPOINTMENT (OUTPATIENT)
Dept: RADIOLOGY | Facility: CLINIC | Age: 65
End: 2025-04-25
Payer: MEDICARE

## 2025-04-30 NOTE — PROGRESS NOTES
ADULT AUDIOLOGY EVALUATION & HEARING AID CHECK      Name:  Mirna Lutz   :  1960  Age:  65 y.o.  Date of Evaluation:  2025    Time: 09:00-09:40    IMPRESSIONS     Right ear: normal middle ear functioning, normal hearing sensitivity sloping to severe sensorineural hearing loss, and fair (72%) word understanding at 85 dB HL.   Left ear: normal middle ear functioning, mild sloping to severe sensorineural hearing loss, and fair (68%) word understanding at 85 dB HL.     These results are progressive in the low frequencies and word understanding, bilaterally, since last evaluation on 2023. Hearing aids were cleaned and reprogrammed to today's updated hearing test. Firmware update on the devices should improve rechargeability functioning. Mirna was encouraged to return at least annually for updated hearing tests and hearing aid checks. Additionally, she was encouraged to try to increase wear-time of the devices gradually. She reported understanding.     RECOMMENDATIONS     Continue medical follow up with primary care provider and/or Ears Nose and Throat (ENT) provider as recommended.  Re-test hearing annually, or sooner if concerns arise.  Strive for full-time device use during waking hours, except when activities preclude device safety.  Appropriate communication techniques (face-to-face at a 4-6 foot maximum distance, reduced background noise, speech at normal volume and slower rate, good lighting, etc).   Avoid exposure to loud sounds by moving away from the noise, turning down the volume, or wearing proper hearing protection correctly.    HISTORY     Ms. Lutz, 65 y.o., was seen today for her annual audiologic evaluation and hearing aid check. Today she reported that her hearing aids were not charging despite trying two different outlets.     Ms. Lutz's most recent hearing evaluation was performed on 2023 by Dr. Kaila Corona, and showed normal hearing sensitivity  sloping to severe sensorineural hearing loss with good word understanding in both ears.     AUDIOGRAM         TEST RESULTS     Otoscopic Evaluation:  Right Ear: Ear canal clear, tympanic membrane visualized.  Left Ear: Ear canal clear, tympanic membrane visualized.    Tympanometry (226 Hz): This test is an objective evaluation of middle ear function. CPT code: 94597   Right Ear: Type A, middle ear pressure and tympanic membrane compliance within normal limits.   Left Ear: Type A, middle ear pressure and tympanic membrane compliance within normal limits.     Acoustic Reflexes: This test is an objective measure of auditory and facial nerve pathways.   (Probe) Right Ear (ipsi right stimulus ear; contralateral left stimulus ear): Could not test as hermetic seal could not be maintained.  (Probe) Left Ear (ipsi left stimulus ear; contralateral right stimulus ear): Could not test as hermetic seal could not be maintained.    Distortion Product Otoacoustic Emissions (DPOAE): This test is a measurement of responses which are generated by the cochlea when it is simultaneously stimulated by two pure tone frequencies. CPT code: 63891   Right Ear: Did not test; known sensorineural hearing loss.   Left Ear: Did not test; known sensorineural hearing loss.   Present OAEs suggest normal or near cochlear outer hair cell function for corresponding frequency region(s). Absent OAEs with normal middle ear function can be consistent with some degree of hearing loss. Assessment of cochlear outer hair cell function may be impacted by outer or middle ear function.    Test technique: Conventional Audiometry via headphones. This test is an evaluation hearing sensitivity via air and bone conduction and speech recognition testing. CPT code: 56448  Reliability: good    Pure Tone Audiometry:     Right Ear: Normal hearing sensitivity 125-500 Hz sloping to severe sensorineural hearing loss through 8000 Hz.   Left Ear: Essentially mild sensorineural  hearing loss 125-750 Hz sloping to severe sensorineural hearing loss through 8000 Hz.     Speech Audiometry:   Right Ear: Speech Reception Threshold (SRT) was obtained at 40 dB HL. This is in good agreement with three frequency Pure Tone Average.   Word Recognition scores were fair (72%) in quiet when words were presented at 85 dB HL. These results are based on [facility] Auditory Test No.6 (NU-6) Ordered by difficulty (N=25).  Left Ear: Speech Reception Threshold (SRT) was obtained at 45 dB HL. This is in good agreement with three frequency Pure Tone Average.   Word Recognition scores were fair (68%) in quiet when words were presented at 85 dB HL. These results are based on [facility] Auditory Test No.6 (NU-6) Ordered by difficulty (N=25).     Comparison of today's results with previous test results: Progressive in the low frequencies and word understanding, bilaterally, since last evaluation on 06/05/2023    HEARING AID CHECK       Hearing Aid Information Right Left    Phonak Phonak   Model Audeo P30-RT Audeo P30-RT   Serial Number [id number]   /Tubing 2M  2M    Ear mold/Dome Small vented dome Small vented dome   Retention No No   Wax Traps CeruStop CeruStop   Battery N/A - Rechargeable N/A - Rechargeable   Other Equipment Phonak  Case Combi     Repair Warranty 10/31/2024   Loss and Damage Warranty 10/31/2024   Fitting Date 09/03/2021   Fitting Audiologist Kaila Corona, AuD, CCC-A     Paired to Phone for phone calls: Unsure  Paired to smart phone application: Unsure  Fitting formula: Phonak Adaptive Digital   Gain Level: 90%  Volume controls active: Yes - verified today.     PROGRAMMING MEASURES     Hearing aids appeared to be charging appropriately in office, although once connected to [employer] software, was prompted to completed a Firmware update in order to improve reliability in charging. Firmware update completed successfully.  Hearing aids were re-programmed to today's hearing test thresholds. Feedback manager was re-run; over-tuned in both ears to account for high-frequency gain. Overall gain was increased to 90%. Verified that volume controls are still active. Mirna reported improved audibility in office. The remainder of programming settings remained the same.     Mirna was re-counseled on how and when to change wax guards and domes. This was demonstrated in office for her today. Hearing aids had appropriate sound volume and quality via listening check. She was provided with additional wax guards and domes to take home.     Hearing evaluation billed to insurance, hearing aid clean/check billed to patient.     PATIENT EDUCATION     Discussed results and recommendations with Ms. Lutz. Questions were addressed and the patient was encouraged to contact our department at [phone] should concerns arise.           June Beth, CCC-A, [facility]  Senior Clinical Audiologist -  Audiology Services

## 2025-05-02 ENCOUNTER — CLINICAL SUPPORT (OUTPATIENT)
Dept: AUDIOLOGY | Facility: CLINIC | Age: 65
End: 2025-05-02

## 2025-05-02 DIAGNOSIS — H90.3 SENSORINEURAL HEARING LOSS, BILATERAL: Primary | ICD-10-CM

## 2025-05-02 PROCEDURE — 92557 COMPREHENSIVE HEARING TEST: CPT

## 2025-05-02 PROCEDURE — 92567 TYMPANOMETRY: CPT

## 2025-05-02 PROCEDURE — V5299 HEARING SERVICE: HCPCS | Mod: AUDSP

## 2025-12-12 ENCOUNTER — APPOINTMENT (OUTPATIENT)
Dept: PRIMARY CARE | Facility: CLINIC | Age: 65
End: 2025-12-12
Payer: MEDICARE